# Patient Record
Sex: MALE | Race: WHITE | NOT HISPANIC OR LATINO | URBAN - METROPOLITAN AREA
[De-identification: names, ages, dates, MRNs, and addresses within clinical notes are randomized per-mention and may not be internally consistent; named-entity substitution may affect disease eponyms.]

---

## 2018-10-17 ENCOUNTER — EMERGENCY (EMERGENCY)
Facility: HOSPITAL | Age: 58
LOS: 1 days | Discharge: HOME | End: 2018-10-17
Attending: EMERGENCY MEDICINE
Payer: COMMERCIAL

## 2018-10-17 VITALS
WEIGHT: 214.95 LBS | HEART RATE: 83 BPM | SYSTOLIC BLOOD PRESSURE: 157 MMHG | RESPIRATION RATE: 16 BRPM | DIASTOLIC BLOOD PRESSURE: 84 MMHG | OXYGEN SATURATION: 100 % | HEIGHT: 71 IN | TEMPERATURE: 98 F

## 2018-10-17 LAB
ALBUMIN SERPL ELPH-MCNC: 4.3 G/DL — SIGNIFICANT CHANGE UP (ref 3.3–5)
ALP SERPL-CCNC: 60 U/L — SIGNIFICANT CHANGE UP (ref 40–120)
ALT FLD-CCNC: 22 U/L — SIGNIFICANT CHANGE UP (ref 10–45)
ANION GAP SERPL CALC-SCNC: 12 MMOL/L — SIGNIFICANT CHANGE UP (ref 5–17)
APTT BLD: 33.6 SEC — SIGNIFICANT CHANGE UP (ref 27.5–37.4)
AST SERPL-CCNC: 16 U/L — SIGNIFICANT CHANGE UP (ref 10–40)
BASOPHILS # BLD AUTO: 0 K/UL — SIGNIFICANT CHANGE UP (ref 0–0.2)
BASOPHILS NFR BLD AUTO: 0.3 % — SIGNIFICANT CHANGE UP (ref 0–2)
BILIRUB SERPL-MCNC: 0.9 MG/DL — SIGNIFICANT CHANGE UP (ref 0.2–1.2)
BUN SERPL-MCNC: 15 MG/DL — SIGNIFICANT CHANGE UP (ref 7–23)
CALCIUM SERPL-MCNC: 9.1 MG/DL — SIGNIFICANT CHANGE UP (ref 8.4–10.5)
CHLORIDE SERPL-SCNC: 107 MMOL/L — SIGNIFICANT CHANGE UP (ref 96–108)
CO2 SERPL-SCNC: 23 MMOL/L — SIGNIFICANT CHANGE UP (ref 22–31)
CREAT SERPL-MCNC: 0.91 MG/DL — SIGNIFICANT CHANGE UP (ref 0.5–1.3)
EOSINOPHIL # BLD AUTO: 0.2 K/UL — SIGNIFICANT CHANGE UP (ref 0–0.5)
EOSINOPHIL NFR BLD AUTO: 2.7 % — SIGNIFICANT CHANGE UP (ref 0–6)
GLUCOSE SERPL-MCNC: 97 MG/DL — SIGNIFICANT CHANGE UP (ref 70–99)
HCT VFR BLD CALC: 45.7 % — SIGNIFICANT CHANGE UP (ref 39–50)
HGB BLD-MCNC: 16 G/DL — SIGNIFICANT CHANGE UP (ref 13–17)
INR BLD: 1.14 RATIO — SIGNIFICANT CHANGE UP (ref 0.88–1.16)
LYMPHOCYTES # BLD AUTO: 2 K/UL — SIGNIFICANT CHANGE UP (ref 1–3.3)
LYMPHOCYTES # BLD AUTO: 25.8 % — SIGNIFICANT CHANGE UP (ref 13–44)
MCHC RBC-ENTMCNC: 29.5 PG — SIGNIFICANT CHANGE UP (ref 27–34)
MCHC RBC-ENTMCNC: 34.9 GM/DL — SIGNIFICANT CHANGE UP (ref 32–36)
MCV RBC AUTO: 84.6 FL — SIGNIFICANT CHANGE UP (ref 80–100)
MONOCYTES # BLD AUTO: 0.6 K/UL — SIGNIFICANT CHANGE UP (ref 0–0.9)
MONOCYTES NFR BLD AUTO: 7.5 % — SIGNIFICANT CHANGE UP (ref 2–14)
NEUTROPHILS # BLD AUTO: 5 K/UL — SIGNIFICANT CHANGE UP (ref 1.8–7.4)
NEUTROPHILS NFR BLD AUTO: 63.7 % — SIGNIFICANT CHANGE UP (ref 43–77)
PLATELET # BLD AUTO: 340 K/UL — SIGNIFICANT CHANGE UP (ref 150–400)
POTASSIUM SERPL-MCNC: 5 MMOL/L — SIGNIFICANT CHANGE UP (ref 3.5–5.3)
POTASSIUM SERPL-SCNC: 5 MMOL/L — SIGNIFICANT CHANGE UP (ref 3.5–5.3)
PROT SERPL-MCNC: 7.1 G/DL — SIGNIFICANT CHANGE UP (ref 6–8.3)
PROTHROM AB SERPL-ACNC: 12.5 SEC — SIGNIFICANT CHANGE UP (ref 9.8–12.7)
RBC # BLD: 5.41 M/UL — SIGNIFICANT CHANGE UP (ref 4.2–5.8)
RBC # FLD: 11.6 % — SIGNIFICANT CHANGE UP (ref 10.3–14.5)
SODIUM SERPL-SCNC: 142 MMOL/L — SIGNIFICANT CHANGE UP (ref 135–145)
WBC # BLD: 7.8 K/UL — SIGNIFICANT CHANGE UP (ref 3.8–10.5)
WBC # FLD AUTO: 7.8 K/UL — SIGNIFICANT CHANGE UP (ref 3.8–10.5)

## 2018-10-17 PROCEDURE — 80053 COMPREHEN METABOLIC PANEL: CPT

## 2018-10-17 PROCEDURE — 85730 THROMBOPLASTIN TIME PARTIAL: CPT

## 2018-10-17 PROCEDURE — 71260 CT THORAX DX C+: CPT

## 2018-10-17 PROCEDURE — 99284 EMERGENCY DEPT VISIT MOD MDM: CPT | Mod: 25

## 2018-10-17 PROCEDURE — 74177 CT ABD & PELVIS W/CONTRAST: CPT

## 2018-10-17 PROCEDURE — 85610 PROTHROMBIN TIME: CPT

## 2018-10-17 PROCEDURE — 74177 CT ABD & PELVIS W/CONTRAST: CPT | Mod: 26

## 2018-10-17 PROCEDURE — 71260 CT THORAX DX C+: CPT | Mod: 26

## 2018-10-17 PROCEDURE — 99053 MED SERV 10PM-8AM 24 HR FAC: CPT

## 2018-10-17 PROCEDURE — 85027 COMPLETE CBC AUTOMATED: CPT

## 2018-10-17 RX ORDER — ACETAMINOPHEN 500 MG
975 TABLET ORAL ONCE
Qty: 0 | Refills: 0 | Status: COMPLETED | OUTPATIENT
Start: 2018-10-17 | End: 2018-10-17

## 2018-10-17 RX ADMIN — Medication 975 MILLIGRAM(S): at 07:40

## 2018-10-17 RX ADMIN — Medication 975 MILLIGRAM(S): at 08:31

## 2018-10-17 NOTE — ED PROVIDER NOTE - CONSTITUTIONAL, MLM
normal... Well appearing, well nourished, awake, alert, oriented to person, place, time/situation and in mild back pain.

## 2018-10-17 NOTE — ED PROVIDER NOTE - OBJECTIVE STATEMENT
58M no PMHx presenting with left sided back pain s/p mechanical fall that occurred yesterday 8PM. He was walking down steps when he slipped down 3 steps, landed on his lower back. Denies any head trauma, neck pain, or LOC. He was ambulatory after the injury. Reports associated moderate pleuritic left sided back pain. This morning at 7AM, he took 600 mg motrin without relief of pain. He comes to the ED for further evaluation. He denies any anticoagulation, headaches, nausea/vomiting, blurry vision, chest pain, abdominal pain, extremity injury, urinary or bowel incontinence, fevers.

## 2018-10-17 NOTE — ED PROVIDER NOTE - ATTENDING CONTRIBUTION TO CARE
59 y/o male with the above documented history and HPI who on exam appears well and comfortable. VSs noted, head NC/AT, EOMsI, neck supple c/ FROM s/ pain, paresthesia or midline c-spine ttp, lungs CTA, chest wall c/ ecchymosis and ttp at its left lower posterior hemithoracic region, cardiac sounds s/ audible m/r/g, abdomen soft, NT/ND s/ ecchymosis, back s/ ecchymosis or midline ttp, extremities s/ asymmetry c/ FROM X 4 s/ hesitation or discomfort, skin s/ ecchymosis or laceration and neurologically completely intact. Screening imaging obtained, the results of which will determine his ultimate treatment and disposition.

## 2018-10-17 NOTE — ED ADULT TRIAGE NOTE - CHIEF COMPLAINT QUOTE
s/p fall yesterday night, fell down 3 steps, left lower back pain, denies LOC or head injury, denies blood thinners

## 2018-10-17 NOTE — ED ADULT NURSE NOTE - OBJECTIVE STATEMENT
Patient  is  alert and  oriented x3.  Color  is good  and  skin warm to touch.  He  tripped  and  fell while going  down  the  stairs.  Bruises  noted to  left  flank.  He  is  c/o  lower  back pain.

## 2018-10-17 NOTE — ED PROVIDER NOTE - ENMT, MLM
NC/AT, Airway patent, Nasal mucosa clear. Mouth with normal mucosa. Throat has no vesicles, no oropharyngeal exudates and uvula is midline. (-) midline tenderness, (+) full active cervical ROM.

## 2018-10-17 NOTE — ED PROVIDER NOTE - MUSCULOSKELETAL, MLM
(-) midline thoracolumbar tenderness, appears normal, range of motion is not limited, (+) mild left paralumbar tenderness, (+) left flank ecchymosis.

## 2018-10-17 NOTE — ED PROVIDER NOTE - CARE PLAN
Principal Discharge DX:	Closed fracture of one rib of left side, initial encounter  Secondary Diagnosis:	Lung nodule

## 2018-10-24 ENCOUNTER — EMERGENCY (EMERGENCY)
Facility: HOSPITAL | Age: 58
LOS: 1 days | Discharge: HOME | End: 2018-10-24
Attending: EMERGENCY MEDICINE
Payer: COMMERCIAL

## 2018-10-24 VITALS
TEMPERATURE: 98 F | OXYGEN SATURATION: 98 % | HEIGHT: 71 IN | HEART RATE: 75 BPM | WEIGHT: 220.02 LBS | SYSTOLIC BLOOD PRESSURE: 159 MMHG | RESPIRATION RATE: 20 BRPM | DIASTOLIC BLOOD PRESSURE: 85 MMHG

## 2018-10-24 VITALS
RESPIRATION RATE: 20 BRPM | DIASTOLIC BLOOD PRESSURE: 90 MMHG | OXYGEN SATURATION: 100 % | TEMPERATURE: 98 F | HEART RATE: 72 BPM | SYSTOLIC BLOOD PRESSURE: 155 MMHG

## 2018-10-24 PROCEDURE — 71046 X-RAY EXAM CHEST 2 VIEWS: CPT

## 2018-10-24 PROCEDURE — 94640 AIRWAY INHALATION TREATMENT: CPT

## 2018-10-24 PROCEDURE — 99284 EMERGENCY DEPT VISIT MOD MDM: CPT

## 2018-10-24 PROCEDURE — 99284 EMERGENCY DEPT VISIT MOD MDM: CPT | Mod: 25

## 2018-10-24 PROCEDURE — 71046 X-RAY EXAM CHEST 2 VIEWS: CPT | Mod: 26

## 2018-10-24 RX ORDER — AZITHROMYCIN 500 MG/1
500 TABLET, FILM COATED ORAL ONCE
Qty: 0 | Refills: 0 | Status: COMPLETED | OUTPATIENT
Start: 2018-10-24 | End: 2018-10-24

## 2018-10-24 RX ORDER — ALBUTEROL 90 UG/1
2 AEROSOL, METERED ORAL
Qty: 1 | Refills: 0 | OUTPATIENT
Start: 2018-10-24

## 2018-10-24 RX ORDER — OXYCODONE HYDROCHLORIDE 5 MG/1
1 TABLET ORAL
Qty: 12 | Refills: 0 | OUTPATIENT
Start: 2018-10-24

## 2018-10-24 RX ORDER — AZITHROMYCIN 500 MG/1
1 TABLET, FILM COATED ORAL
Qty: 4 | Refills: 0 | OUTPATIENT
Start: 2018-10-24 | End: 2018-10-27

## 2018-10-24 RX ORDER — OXYCODONE HYDROCHLORIDE 5 MG/1
10 TABLET ORAL ONCE
Qty: 0 | Refills: 0 | Status: DISCONTINUED | OUTPATIENT
Start: 2018-10-24 | End: 2018-10-24

## 2018-10-24 RX ORDER — ACETAMINOPHEN 500 MG
975 TABLET ORAL ONCE
Qty: 0 | Refills: 0 | Status: COMPLETED | OUTPATIENT
Start: 2018-10-24 | End: 2018-10-24

## 2018-10-24 RX ORDER — AZITHROMYCIN 500 MG/1
1 TABLET, FILM COATED ORAL
Qty: 5 | Refills: 0 | OUTPATIENT
Start: 2018-10-24 | End: 2018-10-28

## 2018-10-24 RX ORDER — IPRATROPIUM/ALBUTEROL SULFATE 18-103MCG
3 AEROSOL WITH ADAPTER (GRAM) INHALATION ONCE
Qty: 0 | Refills: 0 | Status: COMPLETED | OUTPATIENT
Start: 2018-10-24 | End: 2018-10-24

## 2018-10-24 RX ADMIN — OXYCODONE HYDROCHLORIDE 10 MILLIGRAM(S): 5 TABLET ORAL at 12:55

## 2018-10-24 RX ADMIN — AZITHROMYCIN 500 MILLIGRAM(S): 500 TABLET, FILM COATED ORAL at 12:34

## 2018-10-24 RX ADMIN — OXYCODONE HYDROCHLORIDE 10 MILLIGRAM(S): 5 TABLET ORAL at 12:47

## 2018-10-24 RX ADMIN — Medication 3 MILLILITER(S): at 11:45

## 2018-10-24 NOTE — ED PROVIDER NOTE - OBJECTIVE STATEMENT
59 yo male with recent L 11th nondisplaced rib fracture one week ago seen for sob, sputum productive cough, nasal/congestion, subjective chills x 4 days which has been not improving and came in today. He also complains of left sided rib pain not relieved with motrin/tylenol. He has been taking motrin 800mg , alternating with tylenol 1000mg every 4 hours, he notes no sick contacts, no recent travel outside of the recent ED visit one week ago. He notes no recorded temperatures, no dizziness, headaches, abdominal pain, dysuria,.

## 2018-10-24 NOTE — ED PROVIDER NOTE - PROGRESS NOTE DETAILS
xr chest clear, will treat w z-georgette for poss atypical pna, nebs prn, add additional oxy for rib fx.

## 2018-10-24 NOTE — ED PROVIDER NOTE - ENMT, MLM
Airway patent, Nasal mucosa clear. Mouth with normal mucosa. Throat has no vesicles, no oropharyngeal exudates and uvula is midline. + minimalr pharyngeal erythema

## 2018-10-24 NOTE — ED ADULT TRIAGE NOTE - CHIEF COMPLAINT QUOTE
SOB/productive cough/runny nose/chills/congestion x4days; of note seen 1week ago for L sided fractured rib

## 2018-10-24 NOTE — ED PROVIDER NOTE - PLAN OF CARE
1. follow up in 24-48 hours with PMD  2. continue with azithromycin and cefdinir as prescribed  3. keep hydrated  4. continue with motrin 800mg every 6 hours as needed for mild/moderate pain, percocet every 6 hours as needed for severe pain.   5. if you have worsening or concerning symptoms, increased difficulty breathing, return to the ED. probably atypical pna. 1. follow up in 24-48 hours with PMD  2. continue with azithromycin as prescribed  3. keep hydrated  4. continue with motrin 800mg every 6 hours as needed for mild/moderate pain, percocet every 6 hours as needed for severe pain.   5. if you have worsening or concerning symptoms, increased difficulty breathing, return to the ED.

## 2018-10-24 NOTE — ED PROVIDER NOTE - ATTENDING CONTRIBUTION TO CARE
recent L rib fx now w uri symptoms and productive cough.   lll rales , nl rate, full sent, mild diffuse wheeze.  dw him proper use of incent spir. xr chest. Symptomatic treatment.

## 2018-10-24 NOTE — ED PROVIDER NOTE - MUSCULOSKELETAL, MLM
Spine appears normal, range of motion is not limited, no muscle or joint tenderness, + tenderness to left 11th rib

## 2018-10-24 NOTE — ED ADULT NURSE NOTE - OBJECTIVE STATEMENT
1150 58 yr old WM c/o worsening left rib pain, cough, SOB, chills, and bodyaches all week. T&R in ER last wk after injury from fall, diagnosed with left 11th rib fx. A&Ox4. ambulatory. color pink. skin W&D. Dry cough. Expiratory wheezes and coarse breath sounds. Pt smokes 1 pack/day cigarettes

## 2018-10-24 NOTE — ED PROVIDER NOTE - CARE PLAN
Assessment and plan of treatment:	1. follow up in 24-48 hours with PMD  2. continue with azithromycin and cefdinir as prescribed  3. keep hydrated  4. continue with motrin 800mg every 6 hours as needed for mild/moderate pain, percocet every 6 hours as needed for severe pain.   5. if you have worsening or concerning symptoms, increased difficulty breathing, return to the ED. Principal Discharge DX:	Cough  Goal:	probably atypical pna.  Assessment and plan of treatment:	1. follow up in 24-48 hours with PMD  2. continue with azithromycin as prescribed  3. keep hydrated  4. continue with motrin 800mg every 6 hours as needed for mild/moderate pain, percocet every 6 hours as needed for severe pain.   5. if you have worsening or concerning symptoms, increased difficulty breathing, return to the ED.

## 2020-08-19 PROBLEM — J18.9 PNEUMONIA, UNSPECIFIED ORGANISM: Chronic | Status: ACTIVE | Noted: 2018-10-24

## 2020-08-24 ENCOUNTER — APPOINTMENT (OUTPATIENT)
Dept: ORTHOPEDIC SURGERY | Facility: CLINIC | Age: 60
End: 2020-08-24

## 2021-10-13 NOTE — ED ADULT NURSE NOTE - NSFALLRSKASSESSTYPE_ED_ALL_ED
Monitor results read by MD. Results have been scanned into pt chart, located under Media tab and titled as Cardiac Event Monitor. Double click on this to open file for viewing.
Initial (On Arrival)